# Patient Record
Sex: FEMALE | Race: WHITE | NOT HISPANIC OR LATINO | Employment: FULL TIME | URBAN - METROPOLITAN AREA
[De-identification: names, ages, dates, MRNs, and addresses within clinical notes are randomized per-mention and may not be internally consistent; named-entity substitution may affect disease eponyms.]

---

## 2020-08-18 ENCOUNTER — TRANSFERRED RECORDS (OUTPATIENT)
Dept: HEALTH INFORMATION MANAGEMENT | Facility: CLINIC | Age: 30
End: 2020-08-18

## 2021-03-26 ENCOUNTER — TRANSFERRED RECORDS (OUTPATIENT)
Dept: HEALTH INFORMATION MANAGEMENT | Facility: CLINIC | Age: 31
End: 2021-03-26

## 2021-05-07 ENCOUNTER — OFFICE VISIT (OUTPATIENT)
Dept: SURGERY | Facility: CLINIC | Age: 31
End: 2021-05-07
Payer: COMMERCIAL

## 2021-05-07 VITALS
DIASTOLIC BLOOD PRESSURE: 62 MMHG | WEIGHT: 114 LBS | HEART RATE: 67 BPM | HEIGHT: 65 IN | OXYGEN SATURATION: 98 % | SYSTOLIC BLOOD PRESSURE: 104 MMHG | BODY MASS INDEX: 18.99 KG/M2

## 2021-05-07 DIAGNOSIS — N63.0 BREAST MASS: Primary | ICD-10-CM

## 2021-05-07 DIAGNOSIS — Z80.3 FAMILY HISTORY OF MALIGNANT NEOPLASM OF BREAST: ICD-10-CM

## 2021-05-07 PROCEDURE — 99203 OFFICE O/P NEW LOW 30 MIN: CPT | Performed by: SURGERY

## 2021-05-07 RX ORDER — MULTIPLE VITAMINS W/ MINERALS TAB 9MG-400MCG
1 TAB ORAL DAILY
COMMUNITY

## 2021-05-07 ASSESSMENT — MIFFLIN-ST. JEOR: SCORE: 1232.98

## 2021-05-07 NOTE — PROGRESS NOTES
[facility] General Surgery Clinic Consultation    CHIEF COMPLAINT:  Chief Complaint   Patient presents with     Consult     Breast visit       HISTORY OF PRESENT ILLNESS:  Wanda Snider is a 30 year old female who is seen in consultation at the request of Dr. Villela for evaluation of breast cancer risk.  Patient's family history is significant for breast cancer in the patient's mother.  Her mother was initially diagnosed at age 39 with an estrogen receptor positive, unilateral breast cancer.  She underwent surgical intervention followed by chemo and radiation.  She was on as antiestrogen therapy for 10 years.  13 years after her initial diagnosis, she was diagnosed with stage IV breast cancer.  The patient reports that her mother did undergo genetic testing and was found to be negative for the BRCA mutation.  Due to her family history, the patient underwent examination in 2016.  She was noted to have a mass present over the medial aspect of the left breast.  At that time, she underwent ultrasound evaluation and she believes she underwent MRI evaluation as well.  Since that time, she believes that the left breast mass has remained stable.  She does have some breast tenderness bilaterally, which is more pronounced during her menstrual cycle.  She denies nipple discharge.  No other discretely palpable masses bilaterally.  In August 2020, the patient underwent bilateral diagnostic mammogram which was unremarkable.  Left breast ultrasound confirmed presence of a benign appearing 1.7 cm mass at 8:30 position, which was thought likely to be a fibroadenoma.  Repeat left breast ultrasound completed in April 2021 demonstrated stable appearance of the patient's known left mass.  Patient has not undergone any previous breast biopsies.  She has not undergone any formal genetic testing.  As her mother used a sperm donor to conceive, she does not know any significant medical history from her paternal side.  She does  "report additional family history of breast cancer in her mother's cousin.  No family history of ovarian cancer.  Patient began menarche at the age of 12.  She has never been pregnant.  She did use the NuvaRing for approximately 8 years.    REVIEW OF SYSTEMS:  Constitutional: No fevers or chills  Eyes: No blurred or double vision  HENT: Denies headaches, No rhinorrhea, No sore throat  Respiratory: No cough or shortness of breath  Cardiovascular: Denies chest pain or palpitations  Gastrointestinal: No abdominal pain or nausea/vomiting  Genitourinary: No hematuria or dysuria  Musculoskeletal: Denies arthralgias or myalgias  Neurologic: No numbness or tingling  Integumentary: No skin rashes    Past medical history:  -Assessed and noncontributory    History reviewed. No pertinent surgical history.    Family History   Problem Relation Age of Onset     Breast Cancer Mother      Hypertension Maternal Grandmother      Hyperlipidemia Maternal Grandmother      Hypertension Maternal Grandfather      Hyperlipidemia Maternal Grandfather        Social History     Tobacco Use     Smoking status: Never Smoker     Smokeless tobacco: Never Used   Substance Use Topics     Alcohol use: Yes       There is no problem list on file for this patient.      Allergies   Allergen Reactions     Zithromax [Azithromycin] Hives, Itching and Swelling       Current Outpatient Medications   Medication Sig Dispense Refill     BIOTIN PO Daily       CALCIUM-MAGNESIUM-ZINC PO Daily       multivitamin w/minerals (MULTI-VITAMIN) tablet Take 1 tablet by mouth daily         Vitals: /62 (BP Location: Left arm, Patient Position: Sitting, Cuff Size: Adult Regular)   Pulse 67   Ht 1.651 m (5' 5\")   Wt 51.7 kg (114 lb)   SpO2 98%   BMI 18.97 kg/m    BMI= Body mass index is 18.97 kg/m .    EXAM:  General: Vital signs reviewed, in no apparent distress  Eyes: Anicteric  HENT: Normocephalic, atraumatic, trachea midline   Respiratory: Breathing " nonlabored  Cardiovascular: Regular rate and rhythm  Breast: Palpable approximately 2 cm mass over the medial aspect of the patient's left breast; patient does have increased breast density laterally compared to medially but there are no other discretely palpable breast masses on exam  Musculoskeletal: No gross deformities  Neurologic: Grossly nonfocal exam  Psychiatric: Normal mood, affect and insight  Integumentary: Warm and dry    All labs and imaging personally reviewed and significant for:   Results from bilateral diagnostic mammogram and left breast ultrasound x2 reviewed.  Please refer to HPI for findings.    ASSESSMENT:  Wanda Snider is a 30 year old who presents with family history of breast cancer and left breast mass.  Significant pertinent co-morbidities include: None.       PLAN:  A thorough discussion was had today in clinic regarding the patient's elevated risk for breast cancer due to her family history.  I did run the patient's information through the [facility] risk calculator today in clinic.  The patient's 10-year breast cancer risk was found to be 1.2%.  Her lifetime breast cancer risk was found to be 23%, approximately twice average.  Due to these findings, I have recommended referral to the high risk breast clinic to further discuss enhanced screening protocols and formal genetic testing.  Patient is in agreement with this plan.  We also discussed the patient's stable left breast mass.  Patient is not interested in excision of this mass at this time, which I believe is reasonable.  She will continue with imaging follow-up.  She may follow-up in the general surgery clinic on an as-needed basis.    It was my pleasure to participate in the care of Wanda Snider in clinic today. Thank you for this consultation.         Mindy Abarca MD    Please route or send letter to:  Primary Care Provider (PCP) and Referring Provider

## 2021-05-07 NOTE — NURSING NOTE
Breast Patients    BREAST PATIENTS (ALL)    1-Do you have any of the following symptoms? Other: dense areas  2-In which breast are you having the symptoms? both  3-Have you had a Mammogram? Other Location:  [place], [place]    -  Date:  8/2020  4-Have you ever had a breast cyst drained? No  5-Have you ever had a breast biopsy? No  6-Have you ever had a Breast Cancer? No   7-Is there a history of Breast Cancer in your family? Yes   Relationship to you:    Mother  Aunt  8-Have you ever had Ovarian Cancer? No  9-Is there a history of Ovarian Cancer in your family? No  10-Summarize your caffeine intake (i.e. coffee, tea, chocolate, soda etc.): Coffee or tea daily    BREAST PATIENTS (FEMALE)    11-What age did your periods begin? 12  12-Date your last menstrual period began? 4/18/21  13-Number of full-term pregnancies: 0  14-Your age when your first child was born? N/A  15-Did you nurse your children? N/A  16-Are you pregnant now? No  17-Have you begun menopause? No  18-Have you had either ovary removed?No  19-Do you have breast implants? No   20-Do you use hormone replacement therapy?  No  21-Have you taken oral contraceptive pills?  Yes, For how many years?  used Nuva ring for 8 years  22-Have you had an intrauterine device (IUD) placed?  No  23-What is your current bra size?  36D

## 2021-06-08 NOTE — TELEPHONE ENCOUNTER
RECORDS STATUS - BREAST    RECORDS REQUESTED FROM: Family history of malignant neoplasm of breast ref Dr. Abarca   DATE REQUESTED: 7.20.21   NOTES DETAILS STATUS   OFFICE NOTE from referring provider Internal 5.7.21 Dr Mindy Abarca [facility] Gen Surg   OFFICE NOTE from medical oncologist     OFFICE NOTE from surgeon     OFFICE NOTE from radiation oncologist     DISCHARGE SUMMARY from hospital     DISCHARGE REPORT from the ER     OPERATIVE REPORT     MEDICATION LIST Internal    CLINICAL TRIAL TREATMENTS TO DATE     LABS     PATHOLOGY REPORTS  (Tissue diagnosis, Stage, ER/AZ percentage positive and intensity of staining, HER2 IHC, FISH, and all biopsies from breast and any distant metastasis)                     GENONOMIC TESTING     TYPE:   (Next Generation Sequencing, including Foundation One testing, and Oncotype score)     IMAGING (NEED IMAGES & REPORT)     CT SCANS     MRI     MAMMO Report scanned 8.18.20   ULTRASOUND Report scanned 3.26.21   PET     BONE SCAN     BRAIN MRI

## 2021-07-20 ENCOUNTER — VIRTUAL VISIT (OUTPATIENT)
Dept: ONCOLOGY | Facility: CLINIC | Age: 31
End: 2021-07-20
Attending: SURGERY
Payer: COMMERCIAL

## 2021-07-20 ENCOUNTER — PRE VISIT (OUTPATIENT)
Dept: ONCOLOGY | Facility: CLINIC | Age: 31
End: 2021-07-20

## 2021-07-20 DIAGNOSIS — Z80.3 FAMILY HISTORY OF MALIGNANT NEOPLASM OF BREAST: ICD-10-CM

## 2021-07-20 PROCEDURE — 96040 HC GENETIC COUNSELING, EACH 30 MINUTES: CPT | Mod: GT | Performed by: GENETIC COUNSELOR, MS

## 2021-07-20 NOTE — PROGRESS NOTES
7/20/2021    Wanda is a 30 year old who is being evaluated via a billable video visit.      How would you like to obtain your AVS? MyChart  If the video visit is dropped, the invitation should be resent by: Text to cell phone: [phone]  Will anyone else be joining your video visit? No    Video-Visit Details  Type of service:  Video Visit  Video Start Time: 11:55am  Video End Time:12:45pm  Originating Location (pt. Location): Home  Distant Location (provider location):  [facility]   Platform used for Video Visit: Zilta    Referring Provider: Mindy Abarca MD    Presenting Information:   Given concerns regarding the potential for COVID-19 exposure during a clinic visit, Wanda elected for a video genetic counseling visit through the Cancer Risk Management Program to discuss her family history of breast cancer and melanoma. We reviewed this history, cancer screening recommendations, and available genetic testing options.    Personal History:  Wanda is a 30 year old female. She does not have any personal history of cancer.    She had her first menstrual period at age 12, does not have biological children, and is premenopausal. Wanda has her ovaries, fallopian tubes and uterus in place, and she has had no ovarian cancer screening to date. She reports that she has never used hormone replacement therapy.      She had a mammogram in August 2020 to address a left-sided breast lump, which was consistent with a likely fibroadenoma. A follow-up ultrasound was performed in March 2021, which was again consistent with a fibroadenoma. She met with Dr. Abarca regarding this history and is interested in starting increased breast screening. Wanda has not had a colonoscopy and does not regularly do any other cancer screening at this time.    Family History: (Please see scanned pedigree for detailed family history information)    Wanda's mother was diagnosed with breast cancer at age 39 and passed away from  metastatic disease in her 50's. She reportedly pursued BRCA1 and BRCA2 genetic testing more than 10 years ago, that was normal.    Wanda's maternal grandmother was diagnosed with a melanoma on her leg in her 60's and passed away in her 80s.    One maternal first cousin once removed (maternal grandfather's sister's daughter) was diagnosed with triple negative breast cancer in her 40's.    Wanda reports that she has no information regarding her biological paternal relatives, as she was conceived using donor sperm.    Her maternal ethnicity is [place]. Her paternal ethnicity is unknown. There is no known Ashkenazi Latter-day ancestry on either side of her family.    Discussion:    Wanda's family history of breast cancer is suggestive of a hereditary cancer syndrome.    We reviewed the features of sporadic, familial, and hereditary cancers. In looking at Wanda's maternal family history, it is possible that a cancer susceptibility gene is present as her mother and extended cousin have been diagnosed with breast cancer under age 50. Of note, as there is very limited information regarding her biological paternal relatives, this may cause an underestimation of the chance for a paternally inherited cancer syndrome.    We discussed the natural history and genetics of hereditary breast cancer.     We first reviewed Hereditary Breast and Ovarian Cancer (HBOC) syndrome, as her mother may have already pursued testing of the BRCA1 and BRCA2 genes. Though testing approximately 10 years ago would have likely identified the majority of mutations possible in those two genes, current testing technology may be able to identify mutations that were previously missed (I.e. large deletions/duplications, etc.). As such, testing of these two genes using current technology would be appropriate for Wanda.    We discussed that there are additional genes that could cause increased risk for breast and related cancers that would not  have been included in her mother's testing. As many of these genes present with overlapping features in a family and accurate cancer risk cannot always be established based upon the pedigree analysis alone, it would be reasonable for Wanda to consider panel genetic testing to analyze multiple genes at once.    Based on her family history, Wanda meets current National Comprehensive Cancer Network (NCCN) criteria for genetic testing of high penetrance breast cancer genes (i.e. OSCAR, BRCA1, BRCA2, CDH1, CHEK2, PALB2, PTEN, TP53, etc.)..      A detailed handout regarding these genes/syndromes and the information we discussed was provided to Wanda via [employer] and can be found in the after visit summary. Topics included: inheritance pattern, cancer risks, cancer screening recommendations, and also risks, benefits and limitations of testing.    We discussed that genetic testing for cancer susceptibility genes is typically most informative, though, when it is first performed on a family member with a personal history of cancer. Testing is available to Wanda, but with limitations. If Wanda pursues testing at this time and receives a negative result, this does not rule out the possibility of a hereditary cancer syndrome in her and/or her family. Despite these limitations and given that her mother has passed away, Wanda expressed interest in proceeding with testing herself.    We reviewed genetic testing options for hereditary breast and related cancers: actionable breast and gynecologic cancer panel ([employer] Breast and Gyn Cancers Guidelines-Based Panel + BARD1 Gene) and expanded multi-cancer panels (such as the [employer] Common Hereditary Cancers Panel). Wanda expressed interest in only the actionable genes. She opted for the [employer] Breast and Gyn Cancers Guidelines-Based Panel.    This panel includes the following 20 genes: OSCAR, BARD1, BRCA1, BRCA2, BRIP1, CDH1, CHEK2, EPCAM, MLH1, MSH2, MSH6, NBN, NF1, PALB2, PMS2, PTEN, RAD51C,  RAD51D, STK11, and TP53.    Some of the genes on this panel are associated with specific hereditary cancer syndromes and have published management guidelines: Hereditary Breast and Ovarian Cancer syndrome (BRCA1, BRCA2), Tee syndrome (EPCAM, MLH1, MSH2, MSH6, PMS2), Hereditary Diffuse Gastric Cancer (CDH1), Cowden Syndrome (PTEN), Peutz-Jeghers syndrome, Neurofibromatosis type 1 (NF1), and Li Fraumeni syndrome (TP53).       Risk-reducing salpingo-oophorectomy can be considered in women with mutations in BRIP1, RAD51C, or RAD51D. OSCAR, BARD1, CHEK2, NBN, and PALB2 are associated with breast cancer risk and have published screening guidelines.    Consent was obtained over the video. Wanda elected to have a saliva collection kit shipped to her home directly. Once her saliva sample is collected, genetic testing using the Breast and Gyn Cancers Guidelines-Based Panel + BARD1 Gene will be sent to [facility]. Turn around time: 2-3 weeks after Qwilt receives her saliva sample.    Medical Management: For Wanda, we reviewed that the information from genetic testing may determine:    additional cancer screening for which Wanda may qualify (i.e. mammogram and breast MRI, more frequent colonoscopies, more frequent dermatologic exams, etc.),    options for risk reducing surgeries Wanda could consider (i.e. bilateral mastectomy, surgery to remove her ovaries and/or uterus, etc.),      and targeted chemotherapies if she were to develop certain cancers in the future (i.e. immunotherapy for individuals with Tee syndrome, PARP inhibitors, etc.).     These recommendations and possible targeted chemotherapies will be discussed in detail once genetic testing is completed.     Plan:  1) Today Wanda elected to proceed with genetic testing using the Breast and Gyn Cancers Guidelines-Based Panel + BARD1 Gene offered by [facility]. A saliva collection kit will be shipped to her home.  2) The results should be available 2-3  weeks after Shelbi receives her saliva sample.  3) I will contact Wanda by either video or telephone to discuss the results.    Emelia Cochran MS, [facility] [place]  Licensed, Certified Genetic Counselor  Office: [phone]  Pager: [phone]

## 2021-07-20 NOTE — LETTER
7/20/2021         RE: Wanda Snider  [address] [facility] [address]        Dear Colleague,    Thank you for referring your patient, Wanda Snider, to the [facility]. Please see a copy of my visit note below.    7/20/2021    Wanda is a 30 year old who is being evaluated via a billable video visit.      How would you like to obtain your AVS? MyChart  If the video visit is dropped, the invitation should be resent by: Text to cell phone: [phone]  Will anyone else be joining your video visit? No    Video-Visit Details  Type of service:  Video Visit  Video Start Time: 11:55am  Video End Time:12:45pm  Originating Location (pt. Location): Home  Distant Location (provider location):  [facility]   Platform used for Video Visit: Brightbox Charge    Referring Provider: Mindy Abarca MD    Presenting Information:   Given concerns regarding the potential for COVID-19 exposure during a clinic visit, Wanda elected for a video genetic counseling visit through the Cancer Risk Management Program to discuss her family history of breast cancer and melanoma. We reviewed this history, cancer screening recommendations, and available genetic testing options.    Personal History:  Wanda is a 30 year old female. She does not have any personal history of cancer.    She had her first menstrual period at age 12, does not have biological children, and is premenopausal. Wanda has her ovaries, fallopian tubes and uterus in place, and she has had no ovarian cancer screening to date. She reports that she has never used hormone replacement therapy.      She had a mammogram in August 2020 to address a left-sided breast lump, which was consistent with a likely fibroadenoma. A follow-up ultrasound was performed in March 2021, which was again consistent with a fibroadenoma. She met with Dr. Abarca regarding this history and is interested in starting increased breast screening. Wanda  has not had a colonoscopy and does not regularly do any other cancer screening at this time.    Family History: (Please see scanned pedigree for detailed family history information)    Wanda's mother was diagnosed with breast cancer at age 39 and passed away from metastatic disease in her 50's. She reportedly pursued BRCA1 and BRCA2 genetic testing more than 10 years ago, that was normal.    Wanda's maternal grandmother was diagnosed with a melanoma on her leg in her 60's and passed away in her 80s.    One maternal first cousin once removed (maternal grandfather's sister's daughter) was diagnosed with triple negative breast cancer in her 40's.    Wanda reports that she has no information regarding her biological paternal relatives, as she was conceived using donor sperm.    Her maternal ethnicity is [place] and [place]. Her paternal ethnicity is unknown. There is no known Ashkenazi Tenriism ancestry on either side of her family.    Discussion:    Wanda's family history of breast cancer is suggestive of a hereditary cancer syndrome.    We reviewed the features of sporadic, familial, and hereditary cancers. In looking at Wanda's maternal family history, it is possible that a cancer susceptibility gene is present as her mother and extended cousin have been diagnosed with breast cancer under age 50. Of note, as there is very limited information regarding her biological paternal relatives, this may cause an underestimation of the chance for a paternally inherited cancer syndrome.    We discussed the natural history and genetics of hereditary breast cancer.     We first reviewed Hereditary Breast and Ovarian Cancer (HBOC) syndrome, as her mother may have already pursued testing of the BRCA1 and BRCA2 genes. Though testing approximately 10 years ago would have likely identified the majority of mutations possible in those two genes, current testing technology may be able to identify mutations that were previously  missed (I.e. large deletions/duplications, etc.). As such, testing of these two genes using current technology would be appropriate for Wanda.    We discussed that there are additional genes that could cause increased risk for breast and related cancers that would not have been included in her mother's testing. As many of these genes present with overlapping features in a family and accurate cancer risk cannot always be established based upon the pedigree analysis alone, it would be reasonable for Wanda to consider panel genetic testing to analyze multiple genes at once.    Based on her family history, Wanda meets current National Comprehensive Cancer Network (NCCN) criteria for genetic testing of high penetrance breast cancer genes (i.e. OSCAR, BRCA1, BRCA2, CDH1, CHEK2, PALB2, PTEN, TP53, etc.)..      A detailed handout regarding these genes/syndromes and the information we discussed was provided to Wanda via [employer] and can be found in the after visit summary. Topics included: inheritance pattern, cancer risks, cancer screening recommendations, and also risks, benefits and limitations of testing.    We discussed that genetic testing for cancer susceptibility genes is typically most informative, though, when it is first performed on a family member with a personal history of cancer. Testing is available to Wanda, but with limitations. If Wanda pursues testing at this time and receives a negative result, this does not rule out the possibility of a hereditary cancer syndrome in her and/or her family. Despite these limitations and given that her mother has passed away, Wanda expressed interest in proceeding with testing herself.    We reviewed genetic testing options for hereditary breast and related cancers: actionable breast and gynecologic cancer panel ([facility] Breast and Gyn Cancers Guidelines-Based Panel + BARD1 Gene) and expanded multi-cancer panels (such as the [facility] Common Hereditary Cancers Panel). Wanda expressed  interest in only the actionable genes. She opted for the [facility] Breast and Gyn Cancers Guidelines-Based Panel.    This panel includes the following 20 genes: OSCAR, BARD1, BRCA1, BRCA2, BRIP1, CDH1, CHEK2, EPCAM, MLH1, MSH2, MSH6, NBN, NF1, PALB2, PMS2, PTEN, RAD51C, RAD51D, STK11, and TP53.    Some of the genes on this panel are associated with specific hereditary cancer syndromes and have published management guidelines: Hereditary Breast and Ovarian Cancer syndrome (BRCA1, BRCA2), Tee syndrome (EPCAM, MLH1, MSH2, MSH6, PMS2), Hereditary Diffuse Gastric Cancer (CDH1), Cowden Syndrome (PTEN), Peutz-Jeghers syndrome, Neurofibromatosis type 1 (NF1), and Li Fraumeni syndrome (TP53).       Risk-reducing salpingo-oophorectomy can be considered in women with mutations in BRIP1, RAD51C, or RAD51D. OSCAR, BARD1, CHEK2, NBN, and PALB2 are associated with breast cancer risk and have published screening guidelines.    Consent was obtained over the video. Wanda elected to have a saliva collection kit shipped to her home directly. Once her saliva sample is collected, genetic testing using the Breast and Gyn Cancers Guidelines-Based Panel + BARD1 Gene will be sent to [facility]. Turn around time: 2-3 weeks after Shelbi receives her saliva sample.    Medical Management: For Wanda, we reviewed that the information from genetic testing may determine:    additional cancer screening for which Wanda may qualify (i.e. mammogram and breast MRI, more frequent colonoscopies, more frequent dermatologic exams, etc.),    options for risk reducing surgeries Wanda could consider (i.e. bilateral mastectomy, surgery to remove her ovaries and/or uterus, etc.),      and targeted chemotherapies if she were to develop certain cancers in the future (i.e. immunotherapy for individuals with Tee syndrome, PARP inhibitors, etc.).     These recommendations and possible targeted chemotherapies will be discussed in detail once genetic testing is  completed.     Plan:  1) Today Wanad elected to proceed with genetic testing using the Breast and Gyn Cancers Guidelines-Based Panel + BARD1 Gene offered by [facility]. A saliva collection kit will be shipped to her home.  2) The results should be available 2-3 weeks after Shelbi receives her saliva sample.  3) I will contact Wanda by either video or telephone to discuss the results.    Emelia Cochran MS, [facility]  Licensed, Certified Genetic Counselor  Office: [phone]  Pager: [phone]      Again, thank you for allowing me to participate in the care of your patient.        Sincerely,        Emelia Cochran,

## 2021-07-29 NOTE — PATIENT INSTRUCTIONS
Assessing Cancer Risk  Only about 5-10% of cancers are thought to be due to an inherited cancer susceptibility gene.    These families often have:    Several people with the same or related types of cancer    Cancers diagnosed at a young age (before age 50)    Individuals with more than one primary cancer    Multiple generations of the family affected with cancer    Some people may be candidates for genetic testing of more than one gene.  For these families, genetic testing using a cancer panel may be offered.  These panels will test different genes known to increase the risk for breast, ovarian, uterine, and/or other cancers. All of the genes discussed below have published clinical management guidelines for individuals who are found to carry a mutation. The purpose of this handout is to serve as a brief summary of the genes analyzed by the panels used to inquire about hereditary breast and gynecologic cancer:  OSCAR, BRCA1, BRCA2, BRIP1, CDH1, CHEK2, MLH1, MSH2, MSH6, PMS2, EPCAM, PTEN, PALB2, RAD51C, RAD51D, and TP53.  ______________________________________________________________________________  Hereditary Breast and Ovarian Cancer Syndrome   (BRCA1 and BRCA2)  A single mutation in one of the copies of BRCA1 or BRCA2 increases the risk for breast and ovarian cancer, among others.  The risk for pancreatic cancer and melanoma may also be slightly increased in some families.  The chart below shows the chance that someone with a BRCA mutation would develop cancer in his or her lifetime1,2,3,4.        A person s ethnic background is also important to consider, as individuals of Ashkenazi W[place] ancestry have a higher chance of having a BRCA gene mutation.  There are three BRCA mutations that occur more frequently in this population.    Tee Syndrome   (MLH1, MSH2, MSH6, PMS2, and EPCAM)  Currently five genes are known to cause Tee Syndrome: MLH1, MSH2, MSH6, PMS2, and EPCAM.  A single mutation in one of the  Tee Syndrome genes increases the risk for colon, endometrial, ovarian, and stomach cancers.  Other cancers that occur less commonly in Tee Syndrome include urinary tract, skin, and brain cancers.  The chart below shows the chance that a person with Tee syndrome would develop cancer in his or her lifetime5.      *Cancer risk varies depending on Tee syndrome gene found    Cowden Syndrome   (PTEN)  Cowden syndrome is a hereditary condition that increases the risk for breast, thyroid, endometrial, colon, and kidney cancer.  Cowden syndrome is caused by a mutation in the PTEN gene.  A single mutation in one of the copies of PTEN causes Cowden syndrome and increases cancer risk.  The chart below shows the chance that someone with a PTEN mutation would develop cancer in their lifetime6,7.  Other benign features seen in some individuals with Cowden syndrome include benign skin lesions (facial papules, keratoses, lipomas), learning disability, autism, thyroid nodules, colon polyps, and larger head size.      *One recent study found breast cancer risk to be increased to 85%    Li-Fraumeni Syndrome   (TP53)  Li-Fraumeni Syndrome (LFS) is a cancer predisposition syndrome caused by a mutation in the TP53 gene. A single mutation in one of the copies of TP53 increases the risk for multiple cancers. Individuals with LFS are at an increased risk for developing cancer at a young age. The lifetime risk for development of a LFS-associated cancer is 50% by age 30 and 90% by age 60.   Core Cancers: Sarcomas, Breast, Brain, Lung, Leukemias/Lymphomas, Adrenocortical carcinomas  Other Cancers: Gastrointestinal, Thyroid, Skin, Genitourinary    Hereditary Diffuse Gastric Cancer   (CDH1)  Currently, one gene is known to cause hereditary diffuse gastric cancer (HDGC): CDH1.  Individuals with HDGC are at increased risk for diffuse gastric cancer and lobular breast cancer. Of people diagnosed with HDGC, 30-50% have a mutation in the CDH1  gene.  This suggests there are likely other genes that may cause HDGC that have not been identified yet.      Lifetime Cancer Risks    General Population HDGC    Diffuse Gastric  <1% ~80%   Breast 12% 39-52%         Additional Genes  OSCAR  OSCAR is a moderate-risk breast cancer gene. Women who have a mutation in OSCAR can have between a 2-4 fold increased risk for breast cancer compared to the general population8. OSCAR mutations have also been associated with increased risk for pancreatic cancer, however an estimate of this cancer risk is not well understood9. Individuals who inherit two OSCAR mutations have a condition called ataxia-telangiectasia (AT).  This rare autosomal recessive condition affects the nervous system and immune system, and is associated with progressive cerebellar ataxia beginning in childhood.  Individuals with ataxia-telangiectasia often have a weakened immune system and have an increased risk for childhood cancers.    PALB2  Mutations in PALB2 have been shown to increase the risk of breast cancer up to 33-58% in some families; where individuals fall within this risk range is dependent upon family [id number]. PALB2 mutations have also been associated with increased risk for pancreatic cancer, although this risk has not been quantified yet.  Individuals who inherit two PALB2 mutations--one from their mother and one from their father--have a condition called Fanconi Anemia.  This rare autosomal recessive condition is associated with short stature, developmental delay, bone marrow failure, and increased risk for childhood cancers.    CHEK2   CHEK2 is a moderate-risk breast cancer gene.  Women who have a mutation in CHEK2 have around a 2-fold increased risk for breast cancer compared to the general population, and this risk may be higher depending upon family history.11,12,13 Mutations in CHEK2 have also been shown to increase the risk of a number of other cancers, including colon and prostate, however  these cancer risks are currently not well understood.    BRIP1, RAD51C and RAD51D  Mutations in BRIP1, RAD51C, and RAD51D have been shown to increase the risk of ovarian cancer and possibly female breast cancer as well14,15 .       Lifetime Cancer Risk    General Population BRIP1 RAD51C RAD51D   Ovarian 1-2% ~5-8% ~5-9% ~7-15%           Inheritance  All of the cancer syndromes reviewed above are inherited in an autosomal dominant pattern.  This means that if a parent has a mutation, each of his or her children will have a 50% chance of inheriting that same mutation.  Therefore, each child--male or female--would have a 50% chance of being at increased risk for developing cancer.      Image obtained from Genetics Home Reference, 2013     Mutations in some genes can occur de shanice, which means that a person s mutation occurred for the first time in them and was not inherited from a parent.  Now that they have the mutation, however, it can be passed on to future generations.    Genetic Testing  Genetic testing involves a blood test and will look at the genetic information in the OCSAR, BRCA1, BRCA2, BRIP1, CDH1, CHEK2, MLH1, MSH2, MSH6, PMS2, EPCAM, PTEN, PALB2, RAD51C, RAD51D, and TP53 genes for any harmful mutations that are associated with increased cancer risk.  If possible, it is recommended that the person(s) who has had cancer be tested before other family members.  That person will give us the most useful information about whether or not a specific gene is associated with the cancer in the family.    Results  There are three possible results of genetic testing:    Positive--a harmful mutation was identified in one or more of the genes    Negative--no mutation was identified in any of the genes on this panel    Variant of unknown significance--a variation in one of the genes was identified, but it is unclear how this impacts cancer risk in the family    Advantages and Disadvantages   There are advantages and  disadvantages to genetic testing.    Advantages    May clarify your cancer risk    Can help you make medical decisions    May explain the cancers in your family    May give useful information to your family members (if you share your results)    Disadvantages    Possible negative emotional impact of learning about inherited cancer risk    Uncertainty in interpreting a negative test result in some situations    Possible genetic discrimination concerns (see below)    Genetic Information Nondiscrimination Act (ROCIO)  ROCIO is a federal law that protects individuals from health insurance or employment discrimination based on a genetic test result alone.  Although rare, there are currently no legal discrimination protections in terms of life insurance, long term care, or disability insurances.  Visit the [employer] [place] website to learn more.    Reducing Cancer Risk  All of the genes described above have nationally recognized cancer screening guidelines that would be recommended for individuals who test positive.  In addition to increased cancer screening, surgeries may be offered or recommended to reduce cancer risk.  Recommendations are based upon an individual s genetic test result as well as their personal and family history of cancer.    Questions to Think About Regarding Genetic Testing:    What effect will the test result have on me and my relationship with my family members if I have an inherited gene mutation?  If I don t have a gene mutation?    Should I share my test results, and how will my family react to this news, which may also affect them?    Are my children ready to learn new information that may one day affect their own health?    Hereditary Cancer Resources    FORCE: Facing Our Risk of Cancer Empowered facingourrisk.org   Bright Pink bebrightpink.org   Li-Fraumeni Syndrome Association lfsassociation.org   PTEN World PTENworld.com   No stomach for cancer, Inc.  nostomachforcancer.org   Stomach cancer relief network Scrnet.org   Collaborative Group of the Americas on Inherited Colorectal Cancer (CGA) cgaicc.com    Cancer Care cancercare.org   American Cancer Society (ACS) cancer.org   National Cancer [place] (NCI) cancer.gov     Please call us if you have any questions or concerns.   Cancer Risk Management Program [phone] ([phone])  ? Scott Ding, MS, [facility] [phone]  ? Chantal Craig, MS, [facility]  [phone]  ? Daisy Carranza, MS, [facility]  [phone]  ? Emelia Cochran, MS, [facility] [phone]  ? Berna Yasmine, MS, [facility] [phone]  ? Sarah Sullivan, MS, [facility]  [phone]    References  1. Octaviano A, Gisela PDP, Mandie S, Jimy CANTRELL, Uday JE, Rosalinda JL, Bhupendra N, Ernie H, Clary O, Jenni A, Frances B, Samm P, Manessence S, Jaun DM, Hebert N, Che E, Young H, Billy E, Patti J, Gronedilma J, Jihan B, Joshua H, Thorlacius S, Eerola H, Kelsey H, Dmitri K, Nimesh OP. Average risks of breast and ovarian cancer associated with BRCA1 or BRCA2 mutations detected in case series unselected for family history: a combined analysis of 222 studies. Am J Hum Nichelle. 2003;72:1117-30.  2. Irvin MYERS, Kerri M, Alvin G.  BRCA1 and BRCA2 Hereditary Breast and Ovarian Cancer. Gene Reviews online. 2013.  3. Michele YC, Chela S, Thomas G, Galloway S. Breast cancer risk among male BRCA1 and BRCA2 mutation carriers. J Natl Cancer Inst. 2007;99:1811-4.  4. Charles PATTERSON, Joshua I, Ryan J, Pedro Luis E, Manjinder ER, Shelbie F. Risk of breast cancer in male BRCA2 carriers. J Med Nichelle. 2010;47:710-1.  5. National Comprehensive Cancer Network. Clinical practice guidelines in oncology, colorectal cancer screening. Available online (registration required). 2015.  6. Jose Raul RED, Srinath J, Elaine J, Sonia LA, Kev ANTONY, Joon C. Lifetime cancer risks in individuals with germline PTEN mutations. Clin Cancer Res. 2012;18:400-7.  7. Alber CORREA. Cowden Syndrome: A Critical Review of the  Clinical Literature. J Nichelle . 2009:18:13-27.  8. Benton A, Edinson D, Elias S, Julieta P, Mary T, Mitchel M, Yonathan B, Nuha H, Marium R, Elie K, Juan Carlos L, Charles DG, Jaun D, Alex DF, Padma MR, The Breast Cancer Susceptibility Collaboration () & Sravani MYERS. OSCAR mutations that cause ataxia-telangiectasia are breast cancer susceptibility alleles. Nature Genetics. 2006;38:873-875  9. Kulwant N , Kim Y, Nancy J, Kirsten L, Carol GM , Sherly ML, Gallinger S, Madera AG, Syngal S, Igor ML, Mari J , Drake R, Dinah SZ, Arlene JR, Elijah VE, Sohail M, Vojuanpablo B, James N, Mariely RH, Josiane KW, and Nithin AP. OSCAR mutations in patients with hereditary pancreatic cancer. Cancer Discover. 2012;2:41-46  10. Octaviano FERNANDEZ, et al. Breast-Cancer Risk in Families with Mutations in PALB2. NEJM. 2014; 371(6):497-506.  11. CHEK2 Breast Cancer Case-Control Consortium. CHEK2*1100delC and susceptibility to breast cancer: A collaborative analysis involving 10,860 breast cancer cases and 9,065 controls from 10 studies. Am J Hum Nichelle, 74 (2004), pp. 7404-6164  12. Jose T, Julian S, Reno K, et al. Spectrum of Mutations in BRCA1, BRCA2, CHEK2, and TP53 in Families at High Risk of Breast Cancer. SIMON. 2006;295(12):1493-7440.   13. Sudheer C, Elvia D, Asha A, et al. Risk of breast cancer in women with a CHEK2 mutation with and without a family history of breast cancer. J Clin Oncol. 2011;29:5340-0173.  14. Bossman H, Stefani E, Nirav SJ, et al. Contribution of germline mutations in the RAD51B, RAD51C, and RAD51D genes to ovarian cancer in the population. J Clin Oncol. 2015;33(26):6455-5944. Doi:10.1200/JCO.2015.61.2408.  15. Juan T, Ishmael ADAMES, Irineo P, et al. Mutations in BRIP1 confer high risk of ovarian cancer. Susan Nichelle. 2011;43(11):3504-3718. doi:10.1038/ng.955.

## 2021-09-14 NOTE — PROGRESS NOTES
"9/14/2021    Referring Provider: Mindy Abarca MD    Present for Today's Visit: Wanda and [facility] Genetic Counseling Student, Ghassan Morales    Presenting Information:  I met with Wanda today (video visit due to covid19) to discuss her genetic testing results. She submitted a saliva collection kit to [facility] received by the performing lab on 8/12/2021. Breast and Gyn Guidelines-Based panel + BARD1 testing was ordered from [facility]. This testing was done because of Wanda's family history of breast cancer. She was initially seen by my colleague, Emelia Cochran, [place], [facility] [place]20/[place]21 and is following up with me for results as Lotus is out of the office until November.     Genetic Testing Result: NEGATIVE  Wanda is negative for mutations in the OSCAR, BARD1, BRCA1, BRCA2, BRIP1, CDH1, CHEK2, EPCAM, MLH1, MSH2, MSH6, NBN, NF1, PALB2, PMS2, PTEN, RAD51C, RAD51D, STK11, and TP53 genes. No mutations were found in any of the 20 genes analyzed. This test involved sequencing and deletion/duplication analysis of all genes with the exceptions of EPCAM (deletions/duplications only).    Testing did not detect an identifiable mutation associated with Hereditary Breast and Ovarian Cancer syndrome (BRCA1, BRCA2), Tee syndrome (MLH1, MSH2, MSH6, PMS2, EPCAM), Hereditary Diffuse Gastric Cancer (CDH1), Cowden syndrome (PTEN), Li Fraumeni syndrome (TP53), Peutz-Jeghers syndrome (STK11), or Neurofibromatosis type 1 (NF1).    A copy of the test report can be found in the Laboratory tab, dated 8/10/2021 and named \"Laboratory Miscellaneous Order\". The report is scanned in as a linked document.    Interpretation:  There are several different interpretations of this negative test result.    1. One explanation may be that there is a different gene or combination of genes and environment that are associated with the cancers in this family.  2. It is possible that her mother did have a mutation in one of the genes Wanda was tested for, and " she did not inherit it.  3. There is also a small possibility that there is a mutation in one of these genes, and the testing laboratory could not find it with their current testing methods.       Screening:  Based on this negative test result, it is important for Wanda and her relatives to refer back to the family history for appropriate cancer screening.      Based on her personal and family history, Wanda has a 22.6% lifetime risk of developing breast cancer based on the AMADO (v8) model. As such, Wanda meets current National Comprehensive Cancer Network (NCCN) guidelines for high risk breast screening. This includes annual breast MRI in addition to annual mammogram beginning now. In addition, Wanda should be receiving clinical breast exams by her physician. We discussed that Wanda could participate in our Cancer Risk Management Program in which our Nurse Specialist provides an individual screening plan and assists with medical management. Wanda declined a referral at this time and opted to continue with the providers she's already established care with.     Due to the family history of melanoma, Wanda likely has a slight increased risk for developing melanoma. According to the American Cancer Society, Wanda is encouraged to speak to her primary care provider about having regular skin exams and safe skin practices (i.e. sunscreen, self skin exams, limited sun exposure, etc.).      Other population cancer screening options, such as those recommended by the American Cancer Society and the National Comprehensive Cancer Network (NCCN), are also appropriate for Wanda and her family. These screening recommendations may change if there are changes to Wnada's personal and/or family history. Final screening recommendations should be made by each individual's managing physician.    Inheritance:  We reviewed the autosomal dominant inheritance of mutations in these 20 genes. We discussed that Wanda cannot pass on an identifiable  mutation in these genes to any future children based on this test result.  Mutations in these genes do not skip generations.      Additional Testing Considerations:  Although Wanda's genetic testing result was negative, other relatives may still carry a gene mutation associated with breast cancer. Wanda's maternal siblings and extended relatives could consider genetic counseling to discuss genetic testing options. If any of these relatives do pursue genetic testing, Wanda is encouraged to contact us so that we may review the impact of their test results on her.    Summary:  We do not have an explanation for Wanda's family history of breast cancer. While no genetic changes were identified, Wanda may still be at risk for certain cancers due to family history, environmental factors, or other genetic causes not identified by this test.  Because of that, it is important that she continue with cancer screening based on her personal and family history as discussed above.    Genetic testing is rapidly advancing, and new cancer susceptibility genes will most likely be identified in the future. Therefore, Wanda is encouraged to contact us annually or if there are changes in her personal or family history. This may change how we assess her cancer risk, screening, and the testing we would offer.    Plan:  1. A copy of the test results will be mailed to Wanda.  2. She plans to follow-up with her established providers for breast cancer screening moving forward.  3. She should contact us if she would like to discuss updates to genetic testing down the road of if her personal or family history changes.    Berna Underwood MS, CGC  Licensed, Certified Genetic Counselor  [facility]

## 2021-09-15 ENCOUNTER — VIRTUAL VISIT (OUTPATIENT)
Dept: ONCOLOGY | Facility: CLINIC | Age: 31
End: 2021-09-15
Attending: GENETIC COUNSELOR, MS
Payer: COMMERCIAL

## 2021-09-15 DIAGNOSIS — Z91.89 AT HIGH RISK FOR BREAST CANCER: Primary | ICD-10-CM

## 2021-09-15 DIAGNOSIS — Z80.8 FAMILY HISTORY OF MALIGNANT MELANOMA: ICD-10-CM

## 2021-09-15 DIAGNOSIS — Z80.3 FAMILY HISTORY OF MALIGNANT NEOPLASM OF BREAST: ICD-10-CM

## 2021-09-15 PROCEDURE — 999N000069 HC STATISTIC GENETIC COUNSELING, < 16 MIN: Mod: 95 | Performed by: GENETIC COUNSELOR, MS

## 2021-09-15 NOTE — PATIENT INSTRUCTIONS
Negative Genetic Test Result    Genetic Testing  You had a blood test that looked at the genetic information in one or more genes associated with increased cancer risk.  The testing looked for any harmful changes that would stop this particular gene from working like it should. If an individual does not have any harmful changes or variants of unknown significance found from their blood test, their genetic test result is reported as negative.       Results  The genetic test did not identify any pathogenic (harmful) changes in the genes that were tested. There are several possible explanations for a negative test result. Without knowing the gene mutation in your family, the cause of the cancer in you or your relatives is still unknown. Your genetic counselor can help interpret the result for you and your relatives. In this case, there are several reasons that may explain the negative test result:    There may be a gene mutation in the family that you did not inherit.     You may have a gene mutation in a different gene that was not included in the test, or has not yet been discovered.     The cancers in you or your family may be due to a combination of genetic factors and environment (multifactorial/familial).    The cancers in you or your family may be sporadic/random cancers.    There is very small chance that a mutation was not found by current testing methods.  As testing technology evolves over time, it may still be possible to identify a mutation in a gene that was not found on this test.    It is important to note which genes were included in your test. A list of these genes can be found on your test result.    Screening Recommendations  Due to this negative test result, cancer screening recommendations should be based on your personal and family history. This may include increased cancer screening for you and/or your family members. Your genetic counselor and health care provider can help make  appropriate recommendations.      Please call us if you have any questions or concerns.   Cancer Risk Management Program [phone] ([phone])  ? Scott Ding, MS, [facility] [phone]  ? Chantal Craig, MS, [facility]  [phone]  ? Daisy Carranza, MS, [facility]  [phone]  ? Emelia Cochran, MS, [facility] [phone]  ? Berna Underwood, MS, [facility] [phone]  ? Sarah Sullivan, MS, [facility]  [phone]  ? Ernestine Maurer, MS  [phone]

## 2021-09-15 NOTE — LETTER
"    9/15/2021         RE: Wanda Snider  [address]  [place] [address]        Dear Colleague,    Thank you for referring your patient, Wanda Snider, to the [facility]. Please see a copy of my visit note below.    9/14/2021    Referring Provider: Mindy Abarca MD    Present for Today's Visit: Wanda and [facility] Genetic Counseling Student, Ghassan Morales    Presenting Information:  I met with Wanda today (video visit due to covid19) to discuss her genetic testing results. She submitted a saliva collection kit to [employer] received by the performing lab on 8/12/2021. Breast and Gyn Guidelines-Based panel + BARD1 testing was ordered from [employer]. This testing was done because of Wanda's family history of breast cancer. She was initially seen by my colleague, Emelia Cochran, MS, [facility] on 7/20/2021 and is following up with me for results as Lotus is out of the office until November.     Genetic Testing Result: NEGATIVE  Wanda is negative for mutations in the OSCAR, BARD1, BRCA1, BRCA2, BRIP1, CDH1, CHEK2, EPCAM, MLH1, MSH2, MSH6, NBN, NF1, PALB2, PMS2, PTEN, RAD51C, RAD51D, STK11, and TP53 genes. No mutations were found in any of the 20 genes analyzed. This test involved sequencing and deletion/duplication analysis of all genes with the exceptions of EPCAM (deletions/duplications only).    Testing did not detect an identifiable mutation associated with Hereditary Breast and Ovarian Cancer syndrome (BRCA1, BRCA2), Tee syndrome (MLH1, MSH2, MSH6, PMS2, EPCAM), Hereditary Diffuse Gastric Cancer (CDH1), Cowden syndrome (PTEN), Li Fraumeni syndrome (TP53), Peutz-Jeghers syndrome (STK11), or Neurofibromatosis type 1 (NF1).    A copy of the test report can be found in the Laboratory tab, dated 8/10/2021 and named \"Laboratory Miscellaneous Order\". The report is scanned in as a linked document.    Interpretation:  There are several different interpretations of this negative " test result.    1. One explanation may be that there is a different gene or combination of genes and environment that are associated with the cancers in this family.  2. It is possible that her mother did have a mutation in one of the genes Wanda was tested for, and she did not inherit it.  3. There is also a small possibility that there is a mutation in one of these genes, and the testing laboratory could not find it with their current testing methods.       Screening:  Based on this negative test result, it is important for Wanda and her relatives to refer back to the family history for appropriate cancer screening.      Based on her personal and family history, Wanda has a 22.6% lifetime risk of developing breast cancer based on the AMADO (v8) model. As such, Wanda meets current National Comprehensive Cancer Network (NCCN) guidelines for high risk breast screening. This includes annual breast MRI in addition to annual mammogram beginning now. In addition, Wanda should be receiving clinical breast exams by her physician. We discussed that Wanda could participate in our Cancer Risk Management Program in which our Nurse Specialist provides an individual screening plan and assists with medical management. Wanda declined a referral at this time and opted to continue with the providers she's already established care with.     Due to the family history of melanoma, Wanda likely has a slight increased risk for developing melanoma. According to the American Cancer Society, Wanda is encouraged to speak to her primary care provider about having regular skin exams and safe skin practices (i.e. sunscreen, self skin exams, limited sun exposure, etc.).      Other population cancer screening options, such as those recommended by the American Cancer Society and the National Comprehensive Cancer Network (NCCN), are also appropriate for Wanda and her family. These screening recommendations may change if there are changes to Wanda's personal  and/or family history. Final screening recommendations should be made by each individual's managing physician.    Inheritance:  We reviewed the autosomal dominant inheritance of mutations in these 20 genes. We discussed that Wanda cannot pass on an identifiable mutation in these genes to any future children based on this test result.  Mutations in these genes do not skip generations.      Additional Testing Considerations:  Although Wanda's genetic testing result was negative, other relatives may still carry a gene mutation associated with breast cancer. Wanda's maternal siblings and extended relatives could consider genetic counseling to discuss genetic testing options. If any of these relatives do pursue genetic testing, Wanda is encouraged to contact us so that we may review the impact of their test results on her.    Summary:  We do not have an explanation for Wanda's family history of breast cancer. While no genetic changes were identified, Wanda may still be at risk for certain cancers due to family history, environmental factors, or other genetic causes not identified by this test.  Because of that, it is important that she continue with cancer screening based on her personal and family history as discussed above.    Genetic testing is rapidly advancing, and new cancer susceptibility genes will most likely be identified in the future. Therefore, Wanda is encouraged to contact us annually or if there are changes in her personal or family history. This may change how we assess her cancer risk, screening, and the testing we would offer.    Plan:  1. A copy of the test results will be mailed to Wanda.  2. She plans to follow-up with her established providers for breast cancer screening moving forward.  3. She should contact us if she would like to discuss updates to genetic testing down the road of if her personal or family history changes.    Berna Underwood MS, [facility]  Licensed, Certified Genetic Counselor   [facility]  [facility]

## 2021-09-15 NOTE — Clinical Note
"    9/15/2021         RE: Wanda Snider  [address]  [facility] [address]        Dear Colleague,    Thank you for referring your patient, Wanda Snider, to the [facility]. Please see a copy of my visit note below.    9/14/2021    Wanda is a 30 year old who is being evaluated via a billable video visit.      How would you like to obtain your AVS? {AVS Preference:[id number]}  If the video visit is dropped, the invitation should be resent by: {video visit invitation:706207}  Will anyone else be joining your video visit? {:[id number]}  {If patient encounters technical issues they should call [phone] :[id number]}    Video Start Time: {video visit start/end time for provider to select:771344}    Video-Visit Details    Type of service:  Video Visit    Video End Time:{video visit start/end time for provider to select:884352}    Originating Location (pt. Location): {video visit patient location:207405::\"Home\"}    Distant Location (provider location):  [facility]     Platform used for Video Visit: {Virtual Visit Platforms:131902::\"[facility]\"}    Referring Provider: Mindy Abarca MD      Presenting Information:  I spoke to Wanda by phone today to discuss her genetic testing results. Her blood was drawn on 8/10/2021. Breast and Gyn Guidelines-Based panel + BARD1 testing was ordered from [facility]. This testing was done because of Wanda's family history of breast cancer.    Genetic Testing Result: NEGATIVE  Wanda is negative for mutations in the OSCAR, BARD1, BRCA1, BRCA2, BRIP1, CDH1, CHEK2, EPCAM, MLH1, MSH2, MSH6, NBN, NF1, PALB2, PMS2, PTEN, RAD51C, RAD51D, STK11, and TP53 genes. No mutations were found in any of the 20 genes analyzed. This test involved sequencing and deletion/duplication analysis of all genes with the exceptions of EPCAM (deletions/duplications only).    Testing did not detect an identifiable mutation associated with Hereditary Breast and " "Ovarian Cancer syndrome (BRCA1, BRCA2), Tee syndrome (MLH1, MSH2, MSH6, PMS2, EPCAM), Hereditary Diffuse Gastric Cancer (CDH1), Cowden syndrome (PTEN), Li Fraumeni syndrome (TP53), Peutz-Jeghers syndrome (STK11), or Neurofibromatosis type 1 (NF1).    A copy of the test report can be found in the Laboratory tab, dated 8/10/2021 and named \"Laboratory Miscellaneous Order\". The report is scanned in as a linked document.    Interpretation:  We discussed several different interpretations of this negative test result.    1. One explanation may be that there is a different gene or combination of genes and environment that are associated with the cancers in this family.  2. It is possible that her mother did have a mutation in one of the genes Wanda was tested for, and she did not inherit it.  3. There is also a small possibility that there is a mutation in one of these genes, and the testing laboratory could not find it with their current testing methods.       Screening:  Based on this negative test result, it is important for Wanda and her relatives to refer back to the family history for appropriate cancer screening.      Based on her personal and family history, Wanda has a 22.6% lifetime risk of developing breast cancer based on the AMADO (v8) model. As such, Wanda meets current National Comprehensive Cancer Network (NCCN) guidelines for high risk breast screening. This includes annual breast MRI in addition to annual mammogram beginning at age 28 (now).  In addition, Wanda should be receiving clinical breast exams by her physician. We discussed that Wanda could participate in our Cancer Risk Management Program in which our Physician Assistant provides an individual screening plan and assists with medical management. A referral was made to see TAURUS Mcintosh, for this service.    Due to the family history of melanoma, Wanda remains at some increased risk for developing melanoma. According to the American Cancer " Society, Wanda is encouraged to speak to her primary care provider about having regular skin exams and safe skin practices (i.e. sunscreen, self skin exams, limited sun exposure, etc.).      Other population cancer screening options, such as those recommended by the American Cancer Society and the National Comprehensive Cancer Network (NCCN), are also appropriate for Wanda and her family. These screening recommendations may change if there are changes to Wanda's personal and/or family history. Final screening recommendations should be made by each individual's managing physician.    Inheritance:  We reviewed the autosomal dominant inheritance of mutations in these 20 genes. We discussed that Wanda cannot/did not pass on an identifiable mutation in these genes to any future children based on this test result.  Mutations in these genes do not skip generations.      Additional Testing Considerations:  Although Wanda's genetic testing result was negative, other relatives may still carry a gene mutation associated with breast cancer. Genetic counseling is recommended for her maternal siblings and extended relatives to discuss genetic testing options. If any of these relatives do pursue genetic testing, Wanda is encouraged to contact me so that we may review the impact of their test results on her.    Summary:  We do not have an explanation for Wanda's family history of breast cancer and melanoma. While no genetic changes were identified, Wanda may still be at risk for certain cancers due to family history, environmental factors, or other genetic causes not identified by this test.  Because of that, it is important that she continue with cancer screening based on her personal and family history as discussed above.    Genetic testing is rapidly advancing, and new cancer susceptibility genes will most likely be identified in the future. Therefore, I encouraged Wanda to contact me annually or if there are changes in her personal or  "family history.  This may change how we assess her cancer risk, screening, and the testing we would offer.    Plan:  1. A copy of the test results will be mailed to Wanda.  2. She plans to follow-up with ***.  3. She should contact me annually, or sooner if her family history changes.    If Wanda has any further questions, I encouraged her to contact me at [phone].    Time spent on the phone: *** minutes.    Berna Underwood MS, [facility] [place]  Licensed, Certified Genetic Counselor  [facility]  [phone]  [email or web address]            9/14/2021    Referring Provider: Mindy Abarca MD    Present for Today's Visit: Wanda and [facility] Genetic Counseling Student, Ghassan Morales    Presenting Information:  I spoke to Wanda by phone today to discuss her genetic testing results. She submitted a saliva collection kit to [facility] received by the performing lab on 8/12/2021. Breast and Gyn Guidelines-Based panel + BARD1 testing was ordered from [facility]. This testing was done because of Wanda's family history of breast cancer.    Genetic Testing Result: NEGATIVE  Wanda is negative for mutations in the OSCAR, BARD1, BRCA1, BRCA2, BRIP1, CDH1, CHEK2, EPCAM, MLH1, MSH2, MSH6, NBN, NF1, PALB2, PMS2, PTEN, RAD51C, RAD51D, STK11, and TP53 genes. No mutations were found in any of the 20 genes analyzed. This test involved sequencing and deletion/duplication analysis of all genes with the exceptions of EPCAM (deletions/duplications only).    Testing did not detect an identifiable mutation associated with Hereditary Breast and Ovarian Cancer syndrome (BRCA1, BRCA2), Tee syndrome (MLH1, MSH2, MSH6, PMS2, EPCAM), Hereditary Diffuse Gastric Cancer (CDH1), Cowden syndrome (PTEN), Li Fraumeni syndrome (TP53), Peutz-Jeghers syndrome (STK11), or Neurofibromatosis type 1 (NF1).    A copy of the test report can be found in the Laboratory tab, dated 8/10/2021 and named \"Laboratory Miscellaneous Order\". The report is " scanned in as a linked document.    Interpretation:  There are several different interpretations of this negative test result.    1. One explanation may be that there is a different gene or combination of genes and environment that are associated with the cancers in this family.  2. It is possible that her mother did have a mutation in one of the genes Wanda was tested for, and she did not inherit it.  3. There is also a small possibility that there is a mutation in one of these genes, and the testing laboratory could not find it with their current testing methods.       Screening:  Based on this negative test result, it is important for Wanda and her relatives to refer back to the family history for appropriate cancer screening.      Based on her personal and family history, Wanda has a 22.6% lifetime risk of developing breast cancer based on the AMADO (v8) model. As such, Wanda meets current National Comprehensive Cancer Network (NCCN) guidelines for high risk breast screening. This includes annual breast MRI in addition to annual mammogram beginning now. In addition, Wanda should be receiving clinical breast exams by her physician. We discussed that Wanda could participate in our Cancer Risk Management Program in which our Nurse Specialist provides an individual screening plan and assists with medical management. Wanda declined a referral at this time and opted to continue with the providers she's already established care with.     Due to the family history of melanoma, Wanda likely has a slight increased risk for developing melanoma. According to the American Cancer Society, Wanda is encouraged to speak to her primary care provider about having regular skin exams and safe skin practices (i.e. sunscreen, self skin exams, limited sun exposure, etc.).      Other population cancer screening options, such as those recommended by the American Cancer Society and the National Comprehensive Cancer Network (NCCN), are also  appropriate for Wanda and her family. These screening recommendations may change if there are changes to Wanda's personal and/or family history. Final screening recommendations should be made by each individual's managing physician.    Inheritance:  We reviewed the autosomal dominant inheritance of mutations in these 20 genes. We discussed that Wanda cannot pass on an identifiable mutation in these genes to any future children based on this test result.  Mutations in these genes do not skip generations.      Additional Testing Considerations:  Although Wanda's genetic testing result was negative, other relatives may still carry a gene mutation associated with breast cancer. Genetic counseling is recommended for her maternal siblings and extended relatives to discuss genetic testing options. If any of these relatives do pursue genetic testing, Wanda is encouraged to contact me so that we may review the impact of their test results on her.    Summary:  We do not have an explanation for Wanda's family history of breast cancer. While no genetic changes were identified, Wanda may still be at risk for certain cancers due to family history, environmental factors, or other genetic causes not identified by this test.  Because of that, it is important that she continue with cancer screening based on her personal and family history as discussed above.    Genetic testing is rapidly advancing, and new cancer susceptibility genes will most likely be identified in the future. Therefore, Wanda is encouraged to contact us annually or if there are changes in her personal or family history. This may change how we assess her cancer risk, screening, and the testing we would offer.    Plan:  1. A copy of the test results will be mailed to Wanda.  2. She plans to follow-up with her established providers for breast cancer screening moving forward.  3. She should contact us if she would like to discuss updates to genetic testing down the road of if  her personal or family history changes.    Berna Underwood MS, [facility] [place]  Licensed, Certified Genetic Counselor  [facility]                    Again, thank you for allowing me to participate in the care of your patient.        Sincerely,        Berna Malhotra, GC

## 2021-10-17 ENCOUNTER — HEALTH MAINTENANCE LETTER (OUTPATIENT)
Age: 31
End: 2021-10-17

## 2022-05-29 ENCOUNTER — HEALTH MAINTENANCE LETTER (OUTPATIENT)
Age: 32
End: 2022-05-29

## 2022-10-02 ENCOUNTER — HEALTH MAINTENANCE LETTER (OUTPATIENT)
Age: 32
End: 2022-10-02

## 2022-11-28 ENCOUNTER — LAB REQUISITION (OUTPATIENT)
Dept: LAB | Facility: CLINIC | Age: 32
End: 2022-11-28
Payer: COMMERCIAL

## 2022-11-28 DIAGNOSIS — Z34.01 ENCOUNTER FOR SUPERVISION OF NORMAL FIRST PREGNANCY, FIRST TRIMESTER: ICD-10-CM

## 2022-11-28 LAB
BASOPHILS # BLD AUTO: 0.1 10E3/UL (ref 0–0.2)
BASOPHILS NFR BLD AUTO: 1 %
EOSINOPHIL # BLD AUTO: 0.1 10E3/UL (ref 0–0.7)
EOSINOPHIL NFR BLD AUTO: 1 %
ERYTHROCYTE [DISTWIDTH] IN BLOOD BY AUTOMATED COUNT: 11.9 % (ref 10–15)
HCT VFR BLD AUTO: 38.6 % (ref 35–47)
HGB BLD-MCNC: 13 G/DL (ref 11.7–15.7)
HOLD SPECIMEN: NORMAL
HOLD SPECIMEN: NORMAL
IMM GRANULOCYTES # BLD: 0 10E3/UL
IMM GRANULOCYTES NFR BLD: 0 %
LYMPHOCYTES # BLD AUTO: 1.8 10E3/UL (ref 0.8–5.3)
LYMPHOCYTES NFR BLD AUTO: 26 %
MCH RBC QN AUTO: 29.3 PG (ref 26.5–33)
MCHC RBC AUTO-ENTMCNC: 33.7 G/DL (ref 31.5–36.5)
MCV RBC AUTO: 87 FL (ref 78–100)
MONOCYTES # BLD AUTO: 0.4 10E3/UL (ref 0–1.3)
MONOCYTES NFR BLD AUTO: 6 %
NEUTROPHILS # BLD AUTO: 4.5 10E3/UL (ref 1.6–8.3)
NEUTROPHILS NFR BLD AUTO: 66 %
NRBC # BLD AUTO: 0 10E3/UL
NRBC BLD AUTO-RTO: 0 /100
PLATELET # BLD AUTO: 229 10E3/UL (ref 150–450)
RBC # BLD AUTO: 4.43 10E6/UL (ref 3.8–5.2)
WBC # BLD AUTO: 6.9 10E3/UL (ref 4–11)

## 2022-11-28 PROCEDURE — 87491 CHLMYD TRACH DNA AMP PROBE: CPT | Mod: ORL | Performed by: NURSE PRACTITIONER

## 2022-11-28 PROCEDURE — 86762 RUBELLA ANTIBODY: CPT | Mod: ORL | Performed by: NURSE PRACTITIONER

## 2022-11-28 PROCEDURE — 87389 HIV-1 AG W/HIV-1&-2 AB AG IA: CPT | Mod: ORL | Performed by: NURSE PRACTITIONER

## 2022-11-28 PROCEDURE — 85025 COMPLETE CBC W/AUTO DIFF WBC: CPT | Mod: ORL | Performed by: NURSE PRACTITIONER

## 2022-11-28 PROCEDURE — 87086 URINE CULTURE/COLONY COUNT: CPT | Mod: ORL | Performed by: NURSE PRACTITIONER

## 2022-11-28 PROCEDURE — 86803 HEPATITIS C AB TEST: CPT | Mod: ORL | Performed by: NURSE PRACTITIONER

## 2022-11-28 PROCEDURE — 86780 TREPONEMA PALLIDUM: CPT | Mod: ORL | Performed by: NURSE PRACTITIONER

## 2022-11-28 PROCEDURE — 87340 HEPATITIS B SURFACE AG IA: CPT | Mod: ORL | Performed by: NURSE PRACTITIONER

## 2022-11-28 PROCEDURE — 87591 N.GONORRHOEAE DNA AMP PROB: CPT | Mod: ORL | Performed by: NURSE PRACTITIONER

## 2022-11-29 LAB
C TRACH DNA SPEC QL PROBE+SIG AMP: NEGATIVE
HBV SURFACE AG SERPL QL IA: NONREACTIVE
HCV AB SERPL QL IA: NONREACTIVE
HIV 1+2 AB+HIV1 P24 AG SERPL QL IA: NONREACTIVE
N GONORRHOEA DNA SPEC QL NAA+PROBE: NEGATIVE
RUBV IGG SERPL QL IA: 2.33 INDEX
RUBV IGG SERPL QL IA: POSITIVE
T PALLIDUM AB SER QL: NONREACTIVE

## 2022-11-30 LAB — BACTERIA UR CULT: NORMAL

## 2023-04-27 ENCOUNTER — LAB REQUISITION (OUTPATIENT)
Dept: LAB | Facility: CLINIC | Age: 33
End: 2023-04-27
Payer: COMMERCIAL

## 2023-04-27 DIAGNOSIS — Z13.1 ENCOUNTER FOR SCREENING FOR DIABETES MELLITUS: ICD-10-CM

## 2023-04-27 DIAGNOSIS — Z34.03 ENCOUNTER FOR SUPERVISION OF NORMAL FIRST PREGNANCY, THIRD TRIMESTER: ICD-10-CM

## 2023-04-27 LAB
ERYTHROCYTE [DISTWIDTH] IN BLOOD BY AUTOMATED COUNT: 12.9 % (ref 10–15)
HCT VFR BLD AUTO: 36.5 % (ref 35–47)
HGB BLD-MCNC: 12.1 G/DL (ref 11.7–15.7)
MCH RBC QN AUTO: 30.1 PG (ref 26.5–33)
MCHC RBC AUTO-ENTMCNC: 33.2 G/DL (ref 31.5–36.5)
MCV RBC AUTO: 91 FL (ref 78–100)
PLATELET # BLD AUTO: 218 10E3/UL (ref 150–450)
RBC # BLD AUTO: 4.02 10E6/UL (ref 3.8–5.2)
WBC # BLD AUTO: 7.9 10E3/UL (ref 4–11)

## 2023-04-27 PROCEDURE — 82950 GLUCOSE TEST: CPT | Mod: ORL

## 2023-04-27 PROCEDURE — 85027 COMPLETE CBC AUTOMATED: CPT | Mod: ORL

## 2023-04-28 LAB — GLUCOSE 1H P 50 G GLC PO SERPL-MCNC: 141 MG/DL (ref 70–129)

## 2023-05-05 ENCOUNTER — LAB (OUTPATIENT)
Dept: LAB | Facility: CLINIC | Age: 33
End: 2023-05-05
Payer: COMMERCIAL

## 2023-05-05 DIAGNOSIS — Z34.83 PRENATAL CARE, SUBSEQUENT PREGNANCY, THIRD TRIMESTER: Primary | ICD-10-CM

## 2023-05-05 LAB
GESTATIONAL GTT 1 HR POST DOSE: 154 MG/DL (ref 60–179)
GESTATIONAL GTT 2 HR POST DOSE: 135 MG/DL (ref 60–154)
GESTATIONAL GTT 3 HR POST DOSE: 110 MG/DL (ref 60–139)
GLUCOSE P FAST SERPL-MCNC: 82 MG/DL (ref 60–94)

## 2023-05-05 PROCEDURE — 82951 GLUCOSE TOLERANCE TEST (GTT): CPT

## 2023-05-05 PROCEDURE — 82952 GTT-ADDED SAMPLES: CPT

## 2023-05-05 PROCEDURE — 36415 COLL VENOUS BLD VENIPUNCTURE: CPT

## 2023-05-20 ENCOUNTER — HEALTH MAINTENANCE LETTER (OUTPATIENT)
Age: 33
End: 2023-05-20

## 2024-07-27 ENCOUNTER — HEALTH MAINTENANCE LETTER (OUTPATIENT)
Age: 34
End: 2024-07-27

## 2025-08-10 ENCOUNTER — HEALTH MAINTENANCE LETTER (OUTPATIENT)
Age: 35
End: 2025-08-10